# Patient Record
Sex: MALE | Race: WHITE | Employment: OTHER | URBAN - METROPOLITAN AREA
[De-identification: names, ages, dates, MRNs, and addresses within clinical notes are randomized per-mention and may not be internally consistent; named-entity substitution may affect disease eponyms.]

---

## 2017-06-13 PROBLEM — R10.31 RIGHT GROIN PAIN: Status: ACTIVE | Noted: 2017-06-13

## 2017-06-13 PROBLEM — Z87.19 HISTORY OF INGUINAL HERNIA REPAIR, BILATERAL: Status: ACTIVE | Noted: 2017-06-13

## 2017-06-13 PROBLEM — Z90.79 H/O UNILATERAL ORCHIECTOMY: Status: ACTIVE | Noted: 2017-06-13

## 2017-06-13 PROBLEM — K40.90 RIGHT INGUINAL HERNIA: Status: ACTIVE | Noted: 2017-06-13

## 2017-06-13 PROBLEM — Z98.890 HISTORY OF INGUINAL HERNIA REPAIR, BILATERAL: Status: ACTIVE | Noted: 2017-06-13

## 2017-08-16 PROCEDURE — 81001 URINALYSIS AUTO W/SCOPE: CPT | Performed by: FAMILY MEDICINE

## 2017-09-27 PROCEDURE — 88305 TISSUE EXAM BY PATHOLOGIST: CPT | Performed by: UROLOGY

## 2017-10-20 PROCEDURE — 84403 ASSAY OF TOTAL TESTOSTERONE: CPT | Performed by: UROLOGY

## 2017-11-22 PROCEDURE — 82365 CALCULUS SPECTROSCOPY: CPT | Performed by: UROLOGY

## 2017-12-04 PROCEDURE — 80414 TESTOSTERONE RESPONSE PANEL: CPT | Performed by: PHYSICIAN ASSISTANT

## 2017-12-04 PROCEDURE — 81003 URINALYSIS AUTO W/O SCOPE: CPT | Performed by: FAMILY MEDICINE

## 2017-12-04 PROCEDURE — 84402 ASSAY OF FREE TESTOSTERONE: CPT | Performed by: FAMILY MEDICINE

## 2017-12-28 ENCOUNTER — INITIATED (OUTPATIENT)
Dept: SURGERY | Facility: CLINIC | Age: 62
End: 2017-12-28

## 2018-01-03 NOTE — PROGRESS NOTES
LMOAM.    is in low normal range. Pt on testopel. Was considering supplemental androderm to increase levels. Has Rx .

## 2018-05-30 PROBLEM — E29.1 HYPOGONADISM MALE: Status: ACTIVE | Noted: 2018-05-30

## 2018-05-30 PROBLEM — Z90.79 HISTORY OF ORCHIECTOMY, BILATERAL: Status: ACTIVE | Noted: 2017-06-13

## 2018-05-30 PROCEDURE — 84403 ASSAY OF TOTAL TESTOSTERONE: CPT | Performed by: PHYSICIAN ASSISTANT

## 2018-07-03 PROCEDURE — 84403 ASSAY OF TOTAL TESTOSTERONE: CPT | Performed by: PHYSICIAN ASSISTANT

## 2018-08-23 PROCEDURE — 81001 URINALYSIS AUTO W/SCOPE: CPT | Performed by: FAMILY MEDICINE

## 2019-04-23 ENCOUNTER — LAB ENCOUNTER (OUTPATIENT)
Dept: LAB | Age: 64
End: 2019-04-23
Attending: PHYSICIAN ASSISTANT
Payer: COMMERCIAL

## 2019-04-23 DIAGNOSIS — E29.1 3-OXO-5 ALPHA-STEROID DELTA 4-DEHYDROGENASE DEFICIENCY: Primary | ICD-10-CM

## 2019-04-23 PROCEDURE — 84403 ASSAY OF TOTAL TESTOSTERONE: CPT

## 2019-04-23 PROCEDURE — 36415 COLL VENOUS BLD VENIPUNCTURE: CPT

## 2019-04-24 NOTE — PROGRESS NOTES
Levels markedly low on topical androderm. Pt is restarting T injection.    Future Appointments  5/7/2019   10:00 AM   Gayatri Abreu PA-C [facility]

## 2019-05-22 PROCEDURE — 86141 C-REACTIVE PROTEIN HS: CPT | Performed by: FAMILY MEDICINE

## 2019-05-22 PROCEDURE — 81003 URINALYSIS AUTO W/O SCOPE: CPT | Performed by: FAMILY MEDICINE
